# Patient Record
Sex: MALE | Race: BLACK OR AFRICAN AMERICAN | Employment: FULL TIME | URBAN - METROPOLITAN AREA
[De-identification: names, ages, dates, MRNs, and addresses within clinical notes are randomized per-mention and may not be internally consistent; named-entity substitution may affect disease eponyms.]

---

## 2018-07-02 ENCOUNTER — APPOINTMENT (OUTPATIENT)
Dept: GENERAL RADIOLOGY | Facility: CLINIC | Age: 33
End: 2018-07-02
Attending: EMERGENCY MEDICINE
Payer: COMMERCIAL

## 2018-07-02 ENCOUNTER — HOSPITAL ENCOUNTER (EMERGENCY)
Facility: CLINIC | Age: 33
Discharge: HOME | End: 2018-07-02
Attending: EMERGENCY MEDICINE | Admitting: EMERGENCY MEDICINE
Payer: COMMERCIAL

## 2018-07-02 VITALS
DIASTOLIC BLOOD PRESSURE: 90 MMHG | SYSTOLIC BLOOD PRESSURE: 156 MMHG | TEMPERATURE: 98.2 F | RESPIRATION RATE: 18 BRPM | OXYGEN SATURATION: 99 %

## 2018-07-02 DIAGNOSIS — J02.9 ACUTE VIRAL PHARYNGITIS: ICD-10-CM

## 2018-07-02 DIAGNOSIS — R04.2 HEMOPTYSIS: ICD-10-CM

## 2018-07-02 LAB
DEPRECATED S PYO AG THROAT QL EIA: NORMAL
SPECIMEN SOURCE: NORMAL

## 2018-07-02 PROCEDURE — 71046 X-RAY EXAM CHEST 2 VIEWS: CPT

## 2018-07-02 PROCEDURE — 99284 EMERGENCY DEPT VISIT MOD MDM: CPT | Mod: 25

## 2018-07-02 PROCEDURE — 87081 CULTURE SCREEN ONLY: CPT | Performed by: EMERGENCY MEDICINE

## 2018-07-02 PROCEDURE — 87880 STREP A ASSAY W/OPTIC: CPT | Performed by: EMERGENCY MEDICINE

## 2018-07-02 ASSESSMENT — ENCOUNTER SYMPTOMS
VOMITING: 1
SHORTNESS OF BREATH: 0
TROUBLE SWALLOWING: 0
COUGH: 0
ARTHRALGIAS: 0
VOICE CHANGE: 0
BACK PAIN: 0
FEVER: 0
NAUSEA: 0
SORE THROAT: 1

## 2018-07-02 NOTE — ED AVS SNAPSHOT
[facility] Emergency Department    [address]    [facility] [address]    Phone:  [phone]    Fax:  [phone]                                       Miko Dunn   MRN: [id number]    Department:  [facility] Emergency Department   Date of Visit:  7/2/2018           After Visit Summary Signature Page     I have received my discharge instructions, and my questions have been answered. I have discussed any challenges I see with this plan with the nurse or doctor.    ..........................................................................................................................................  Patient/Patient Representative Signature      ..........................................................................................................................................  Patient Representative Print Name and Relationship to Patient    ..................................................               ................................................  Date                                            Time    ..........................................................................................................................................  Reviewed by Signature/Title    ...................................................              ..............................................  Date                                                            Time

## 2018-07-02 NOTE — ED AVS SNAPSHOT
[facility] Emergency Department    [address]    [facility] [address]    Phone:  [phone]    Fax:  [phone]                                       Miko Dunn   MRN: [id number]    Department:  [facility] Emergency Department   Date of Visit:  7/2/2018           Patient Information     Date Of Birth          1985        Your diagnoses for this visit were:     Acute viral pharyngitis     Hemoptysis        You were seen by Rom Laguerre MD.      Follow-up Information     Follow up with PCP as needed.        Discharge Instructions         Hemoptysis    Hemoptysis is the medical term for coughing up blood. There are many causes for this, including minor illnesses like bronchitis. Hemoptysis can also be an early sign of a more serious illness, like a blood clot in the lung (pulmonary embolism), cancer, tuberculosis, or pneumonia.  Less common causes of hemoptysis can be hard to diagnose in an emergency department or a clinic. More testing will be needed if the symptoms continue.  Home care    Stay away from cigarette smoke. Smoke irritates the bronchial passages.    Unless you are taking daily aspirin to prevent stroke or heart attack, do not take aspirin or products that contain aspirin. Aspirin affects how readily the blood clots. Medicines that prevent clotting may make hemoptysis worse.    If you have a lung infection, drinking extra fluid will help loosen secretions in the lungs.    Over-the-counter cough medicines that contain dextromethorphan may help reduce coughing. Check with a medical provider before taking dextromethorphan if you have a chronic illness, are pregnant, or take daily medications.    If you were prescribed an antibiotic, take it until it is all gone. Take it even if you are feeling better after only a few days.  Follow-up care  Follow up with your healthcare provider, or as advised.  When to seek medical advice  Call your healthcare  provider right away if any of these occur:    Fever of 100.4 F (38 C) or higher, or as directed by your healthcare provider  Call 911  Call 911 if any of these occur:    Coughing up an increased amount of blood    Trouble breathing, wheezing, or pain with breathing    Chest pain or chest pressure    Fainting or losing consciousness    Rapid heartbeat    Weakness or dizziness  Date Last Reviewed: 9/13/2015 2000-2017 [employer]. [address] [place] [address]. All rights reserved. This information is not intended as a substitute for professional medical care. Always follow your healthcare professional's instructions.          Hemoptysis    Hemoptysis is the medical term for coughing up blood. There are many causes for this, including minor illnesses like bronchitis. Hemoptysis can also be an early sign of a more serious illness, like a blood clot in the lung (pulmonary embolism), cancer, tuberculosis, or pneumonia.  Less common causes of hemoptysis can be hard to diagnose in an emergency department or a clinic. More testing will be needed if the symptoms continue.  Home care    Stay away from cigarette smoke. Smoke irritates the bronchial passages.    Unless you are taking daily aspirin to prevent stroke or heart attack, do not take aspirin or products that contain aspirin. Aspirin affects how readily the blood clots. Medicines that prevent clotting may make hemoptysis worse.    If you have a lung infection, drinking extra fluid will help loosen secretions in the lungs.    Over-the-counter cough medicines that contain dextromethorphan may help reduce coughing. Check with a medical provider before taking dextromethorphan if you have a chronic illness, are pregnant, or take daily medications.    If you were prescribed an antibiotic, take it until it is all gone. Take it even if you are feeling better after only a few days.  Follow-up care  Follow up with your healthcare provider, or as  advised.  When to seek medical advice  Call your healthcare provider right away if any of these occur:    Fever of 100.4 F (38 C) or higher, or as directed by your healthcare provider  Call 911  Call 911 if any of these occur:    Coughing up an increased amount of blood    Trouble breathing, wheezing, or pain with breathing    Chest pain or chest pressure    Fainting or losing consciousness    Rapid heartbeat    Weakness or dizziness  Date Last Reviewed: 9/13/2015 2000-2017 [employer]. [address] [place], [place] [id number]. All rights reserved. This information is not intended as a substitute for professional medical care. Always follow your healthcare professional's instructions.          24 Hour Appointment Hotline       To make an appointment at any [facility], call [phone] ([phone]). If you don't have a family doctor or clinic, we will help you find one. [facility] clinics are conveniently located to serve the needs of you and your family.             Review of your medicines      Notice     You have not been prescribed any medications.            Procedures and tests performed during your visit     Beta strep group A culture    Chest XR,  PA & LAT    Rapid strep screen      Orders Needing Specimen Collection     None      Pending Results     Date and Time Order Name Status Description    7/2/2018 0445 Beta strep group A culture In process             Pending Culture Results     Date and Time Order Name Status Description    7/2/2018 0445 Beta strep group A culture In process             Pending Results Instructions     If you had any lab results that were not finalized at the time of your Discharge, you can call the ED Lab Result RN at [phone]. You will be contacted by this team for any positive Lab results or changes in treatment. The nurses are available 7 days a week from 10A to 6:30P.  You can leave a message 24 hours per day and they will return your call.         Test Results From Your Hospital Stay        7/2/2018  5:04 AM      Component Results     Component    Specimen Description    Throat    Rapid Strep A Screen    NEGATIVE: No Group A streptococcal antigen detected by immunoassay, await culture report.         7/2/2018  5:05 AM         7/2/2018  7:01 AM      Narrative     CHEST 2 VIEWS   7/2/2018 6:06 AM     HISTORY: Cough.    COMPARISON: None.    FINDINGS: The lungs are clear. Normal-sized cardiac silhouette.        Impression     IMPRESSION: No evidence of active cardiopulmonary disease.    KASSIDY HAYES MD                Clinical Quality Measure: Blood Pressure Screening     Your blood pressure was checked while you were in the emergency department today. The last reading we obtained was  BP: 156/90 . Please read the guidelines below about what these numbers mean and what you should do about them.  If your systolic blood pressure (the top number) is less than 120 and your diastolic blood pressure (the bottom number) is less than 80, then your blood pressure is normal. There is nothing more that you need to do about it.  If your systolic blood pressure (the top number) is 120-139 or your diastolic blood pressure (the bottom number) is 80-89, your blood pressure may be higher than it should be. You should have your blood pressure rechecked within a year by a primary care provider.  If your systolic blood pressure (the top number) is 140 or greater or your diastolic blood pressure (the bottom number) is 90 or greater, you may have high blood pressure. High blood pressure is treatable, but if left untreated over time it can put you at risk for heart attack, stroke, or kidney failure. You should have your blood pressure rechecked by a primary care provider within the next 4 weeks.  If your provider in the emergency department today gave you specific instructions to follow-up with your doctor or provider even sooner than that, you should follow that instruction and  "not wait for up to 4 weeks for your follow-up visit.        Thank you for choosing [facility]       Thank you for choosing [facility] for your care. Our goal is always to provide you with excellent care. Hearing back from our patients is one way we can continue to improve our services. Please take a few minutes to complete the written survey that you may receive in the mail after you visit with us. Thank you!        [employer] Information     [employer] lets you send messages to your doctor, view your test results, renew your prescriptions, schedule appointments and more. To sign up, go to [email or web address] . Click on \"Log in\" on the left side of the screen, which will take you to the Welcome page. Then click on \"Sign up Now\" on the right side of the page.     You will be asked to enter the access code listed below, as well as some personal information. Please follow the directions to create your username and password.     Your access code is: [email or web address]  Expires: 2018  6:37 AM     Your access code will  in 90 days. If you need help or a new code, please call your [facility] clinic or [phone].        Care EveryWhere ID     This is your Care EveryWhere ID. This could be used by other organizations to access your [facility] medical records  [id number]        Equal Access to Services     SRAVAN HUANG AH: Hannah Cerda, waaxda luqadaha, qaybta kaalmada lisa, fernando jacobs. So [facility] [phone].    ATENCIÓN: Si habla español, tiene a thompson disposición servicios gratuitos de asistencia lingüística. Llame al [phone].    We comply with applicable federal civil rights laws and [place] laws. We do not discriminate on the basis of race, color, national origin, age, disability, sex, sexual orientation, or gender identity.            After Visit Summary       This is your record. Keep this with you and show to your community pharmacist(s) and doctor(s) at your next " visit.

## 2018-07-02 NOTE — LETTER
July 2, 2018      To Whom It May Concern:      Miko Dunn was seen in our Emergency Department today, 07/02/18.  I expect his condition to improve over the next 1 day.  He may return to work/school when improved.    Sincerely,        Donna Marlow RN

## 2018-07-02 NOTE — ED TRIAGE NOTES
Alert and oriented x 3 airway,breathing and circulation intact, sore throat today, also c/o off and on rash on throat

## 2018-07-02 NOTE — DISCHARGE INSTRUCTIONS
Hemoptysis    Hemoptysis is the medical term for coughing up blood. There are many causes for this, including minor illnesses like bronchitis. Hemoptysis can also be an early sign of a more serious illness, like a blood clot in the lung (pulmonary embolism), cancer, tuberculosis, or pneumonia.  Less common causes of hemoptysis can be hard to diagnose in an emergency department or a clinic. More testing will be needed if the symptoms continue.  Home care    Stay away from cigarette smoke. Smoke irritates the bronchial passages.    Unless you are taking daily aspirin to prevent stroke or heart attack, do not take aspirin or products that contain aspirin. Aspirin affects how readily the blood clots. Medicines that prevent clotting may make hemoptysis worse.    If you have a lung infection, drinking extra fluid will help loosen secretions in the lungs.    Over-the-counter cough medicines that contain dextromethorphan may help reduce coughing. Check with a medical provider before taking dextromethorphan if you have a chronic illness, are pregnant, or take daily medications.    If you were prescribed an antibiotic, take it until it is all gone. Take it even if you are feeling better after only a few days.  Follow-up care  Follow up with your healthcare provider, or as advised.  When to seek medical advice  Call your healthcare provider right away if any of these occur:    Fever of 100.4 F (38 C) or higher, or as directed by your healthcare provider  Call 911  Call 911 if any of these occur:    Coughing up an increased amount of blood    Trouble breathing, wheezing, or pain with breathing    Chest pain or chest pressure    Fainting or losing consciousness    Rapid heartbeat    Weakness or dizziness  Date Last Reviewed: 9/13/2015 2000-2017 [employer]. [address] [place] [address], [place] [address]. All rights reserved. This information is not intended as a substitute for professional medical care. Always  follow your healthcare professional's instructions.          Hemoptysis    Hemoptysis is the medical term for coughing up blood. There are many causes for this, including minor illnesses like bronchitis. Hemoptysis can also be an early sign of a more serious illness, like a blood clot in the lung (pulmonary embolism), cancer, tuberculosis, or pneumonia.  Less common causes of hemoptysis can be hard to diagnose in an emergency department or a clinic. More testing will be needed if the symptoms continue.  Home care    Stay away from cigarette smoke. Smoke irritates the bronchial passages.    Unless you are taking daily aspirin to prevent stroke or heart attack, do not take aspirin or products that contain aspirin. Aspirin affects how readily the blood clots. Medicines that prevent clotting may make hemoptysis worse.    If you have a lung infection, drinking extra fluid will help loosen secretions in the lungs.    Over-the-counter cough medicines that contain dextromethorphan may help reduce coughing. Check with a medical provider before taking dextromethorphan if you have a chronic illness, are pregnant, or take daily medications.    If you were prescribed an antibiotic, take it until it is all gone. Take it even if you are feeling better after only a few days.  Follow-up care  Follow up with your healthcare provider, or as advised.  When to seek medical advice  Call your healthcare provider right away if any of these occur:    Fever of 100.4 F (38 C) or higher, or as directed by your healthcare provider  Call 911  Call 911 if any of these occur:    Coughing up an increased amount of blood    Trouble breathing, wheezing, or pain with breathing    Chest pain or chest pressure    Fainting or losing consciousness    Rapid heartbeat    Weakness or dizziness  Date Last Reviewed: 9/13/2015 2000-2017 [employer]. [address] [place] [id number]. All rights reserved. This information is not intended  as a substitute for professional medical care. Always follow your healthcare professional's instructions.

## 2018-07-02 NOTE — ED PROVIDER NOTES
History     Chief Complaint:  Pharyngitis    HPI   Miko Dunn is a 33 year old male who presents with pharyngitis. The patient reports that he first developed a sore throat approximately 3 days ago and since has developed a cough with vomiting secondary to this. Within the past 24 hours the patient developed congestion and rhinorrhea. He presents today as he had an episode of coughing with a small amount of red streaking in it and became concerned. He denies any fevers, chest pain, shortness of breath, or any other symptoms.    Allergies:  No known drug allergies.    Medications:    The patient is not currently taking any prescribed medications.     Past Medical History:    No significant past medical history.     Past Surgical History:    No pertinent past surgical history.    Family History:    No pertinent family history.    Social History:  Smoking status: Current smoker  Alcohol use: Yes  Marital Status:  Single      Review of Systems   Constitutional: Negative for fever.   HENT: Positive for sore throat. Negative for trouble swallowing and voice change.    Respiratory: Negative for cough and shortness of breath.    Cardiovascular: Negative for chest pain.   Gastrointestinal: Positive for vomiting. Negative for nausea.   Musculoskeletal: Negative for arthralgias and back pain.   All other systems reviewed and are negative.    Physical Exam     Patient Vitals for the past 24 hrs:   BP Temp Temp src Heart Rate Resp SpO2   07/02/18 0430 156/90 98.2  F (36.8  C) Temporal 76 18 99 %         Physical Exam  Nursing note and vitals reviewed.  Constitutional: Cooperative. Sitting up comfortably   HENT:  Oropharynx normal other than slight erythema.  No abscess or exudate.   Mouth/Throat: Mucous membranes are normal.    Cardiovascular: Normal rate, regular rhythm and normal heart sounds.  No murmur.  Pulmonary/Chest: Effort normal and breath sounds normal. No respiratory distress. No wheezes. No rales.    Abdominal: Soft. Normal appearance and bowel sounds are normal. No distension. There is no tenderness. There is no rigidity and no guarding.   Neurological: Alert.   Skin: Skin is warm and dry. Slight erythematous rash to anterior neck  Psychiatric: Normal mood and affect.      Emergency Department Course   Imaging:  XR Chest 2 views  IMPRESSION: No evidence of active cardiopulmonary disease.  Report per radiology.     Laboratory:  Rapid strep test is negative.  Beta Strep Culture A: Pending    Emergency Department Course:  Nursing notes and vitals reviewed.  (0511) I performed an exam of the patient as documented above.    The patient was sent for a x-ray while in the emergency department, findings above.   The patient's throat was swabbed and this sample was sent for rapid strep screen, findings above.     Findings and plan explained to the patient. Patient discharged home with instructions regarding supportive care, medications, and reasons to return. The importance of close follow-up was reviewed.  Impression & Plan    Medical Decision Making:  Miko Dunn is a 33 year old male who presents with sore throat and cough. He had some mild hemoptysis but no concern for large volume hemoptysis. Findings not concerning for pulmonary embolism with clear infectious symptoms. Chest x-ray is clear. Strep test was negative. He also describes a rash on his neck. He does have mild erythema which has been present for 3-4 months. No concern for emergent or life-threatening etiology to this rash and recommended outpatient follow up.     Diagnosis:    ICD-10-CM   1. Acute viral pharyngitis J02.8   2. Hemoptysis R04.2       Disposition:  Patient is discharged to home.            I, Ray Andrea, am serving as a scribe on 7/2/2018 at 5:11 AM to personally document services performed by Dr. Laguerre based on my observations and the provider's statements to me.      Ray Andrea  7/2/2018   [facility]  EMERGENCY DEPARTMENT       Rom Laguerre MD  07/02/18 3608

## 2018-07-04 LAB
BACTERIA SPEC CULT: NORMAL
SPECIMEN SOURCE: NORMAL

## 2019-08-22 ENCOUNTER — OFFICE VISIT (OUTPATIENT)
Dept: FAMILY MEDICINE | Facility: CLINIC | Age: 34
End: 2019-08-22
Payer: COMMERCIAL

## 2019-08-22 ENCOUNTER — ANCILLARY PROCEDURE (OUTPATIENT)
Dept: GENERAL RADIOLOGY | Facility: CLINIC | Age: 34
End: 2019-08-22
Attending: FAMILY MEDICINE
Payer: COMMERCIAL

## 2019-08-22 VITALS
SYSTOLIC BLOOD PRESSURE: 128 MMHG | TEMPERATURE: 98.1 F | RESPIRATION RATE: 12 BRPM | WEIGHT: 244 LBS | HEIGHT: 74 IN | HEART RATE: 66 BPM | DIASTOLIC BLOOD PRESSURE: 74 MMHG | OXYGEN SATURATION: 98 % | BODY MASS INDEX: 31.32 KG/M2

## 2019-08-22 DIAGNOSIS — Z00.00 ROUTINE GENERAL MEDICAL EXAMINATION AT A HEALTH CARE FACILITY: Primary | ICD-10-CM

## 2019-08-22 DIAGNOSIS — R19.7 DIARRHEA OF PRESUMED INFECTIOUS ORIGIN: ICD-10-CM

## 2019-08-22 DIAGNOSIS — E66.09 CLASS 1 OBESITY DUE TO EXCESS CALORIES WITHOUT SERIOUS COMORBIDITY IN ADULT, UNSPECIFIED BMI: ICD-10-CM

## 2019-08-22 DIAGNOSIS — R40.0 HAS DAYTIME DROWSINESS: ICD-10-CM

## 2019-08-22 DIAGNOSIS — E66.811 CLASS 1 OBESITY DUE TO EXCESS CALORIES WITHOUT SERIOUS COMORBIDITY IN ADULT, UNSPECIFIED BMI: ICD-10-CM

## 2019-08-22 DIAGNOSIS — Z72.0 TOBACCO ABUSE: ICD-10-CM

## 2019-08-22 DIAGNOSIS — D22.9 ATYPICAL NEVUS: ICD-10-CM

## 2019-08-22 DIAGNOSIS — Z23 NEED FOR VACCINATION: ICD-10-CM

## 2019-08-22 LAB
FEF 25/75: NORMAL
FEV-1: NORMAL
FEV1/FVC: NORMAL
FVC: NORMAL

## 2019-08-22 PROCEDURE — 36415 COLL VENOUS BLD VENIPUNCTURE: CPT | Performed by: FAMILY MEDICINE

## 2019-08-22 PROCEDURE — 90732 PPSV23 VACC 2 YRS+ SUBQ/IM: CPT | Performed by: FAMILY MEDICINE

## 2019-08-22 PROCEDURE — 90471 IMMUNIZATION ADMIN: CPT | Performed by: FAMILY MEDICINE

## 2019-08-22 PROCEDURE — 99385 PREV VISIT NEW AGE 18-39: CPT | Mod: 25 | Performed by: FAMILY MEDICINE

## 2019-08-22 PROCEDURE — 80061 LIPID PANEL: CPT | Performed by: FAMILY MEDICINE

## 2019-08-22 PROCEDURE — 87389 HIV-1 AG W/HIV-1&-2 AB AG IA: CPT | Performed by: FAMILY MEDICINE

## 2019-08-22 PROCEDURE — 71046 X-RAY EXAM CHEST 2 VIEWS: CPT

## 2019-08-22 PROCEDURE — 84443 ASSAY THYROID STIM HORMONE: CPT | Performed by: FAMILY MEDICINE

## 2019-08-22 PROCEDURE — 90472 IMMUNIZATION ADMIN EACH ADD: CPT | Performed by: FAMILY MEDICINE

## 2019-08-22 PROCEDURE — 80053 COMPREHEN METABOLIC PANEL: CPT | Performed by: FAMILY MEDICINE

## 2019-08-22 PROCEDURE — 94010 BREATHING CAPACITY TEST: CPT | Performed by: FAMILY MEDICINE

## 2019-08-22 PROCEDURE — 90715 TDAP VACCINE 7 YRS/> IM: CPT | Performed by: FAMILY MEDICINE

## 2019-08-22 RX ORDER — VARENICLINE TARTRATE 1 MG/1
1 TABLET, FILM COATED ORAL 2 TIMES DAILY
Qty: 60 TABLET | Refills: 11 | Status: SHIPPED | OUTPATIENT
Start: 2019-08-22

## 2019-08-22 ASSESSMENT — ENCOUNTER SYMPTOMS
DIZZINESS: 0
FREQUENCY: 0
WEAKNESS: 0
COUGH: 1
EYE PAIN: 0
NERVOUS/ANXIOUS: 0
SHORTNESS OF BREATH: 0
NAUSEA: 0
DYSURIA: 0
PARESTHESIAS: 0
PALPITATIONS: 0
HEADACHES: 1
HEARTBURN: 0
ARTHRALGIAS: 0
CONSTIPATION: 0
DIARRHEA: 1
FEVER: 0
HEMATURIA: 0
JOINT SWELLING: 0
SORE THROAT: 0
MYALGIAS: 1
CHILLS: 0
HEMATOCHEZIA: 0
ABDOMINAL PAIN: 0

## 2019-08-22 ASSESSMENT — MIFFLIN-ST. JEOR: SCORE: 2106.53

## 2019-08-22 NOTE — LETTER
August 27, 2019      Miko Dunn  [address] [place] [address]        Dear LuzDunn,    We are writing to inform you of your test results.    Tests are quite good     Resulted Orders   HIV Screening   Result Value Ref Range    HIV Antigen Antibody Combo Nonreactive NR^Nonreactive          Comment:      HIV-1 p24 Ag & HIV-1/HIV-2 Ab Not Detected   Lipid panel reflex to direct LDL Non-fasting   Result Value Ref Range    Cholesterol 211 (H) <200 mg/dL      Comment:      Desirable:       <200 mg/dl    Triglycerides 126 <150 mg/dL      Comment:      Fasting specimen    HDL Cholesterol 39 (L) >39 mg/dL    LDL Cholesterol Calculated 147 (H) <100 mg/dL      Comment:      Above desirable:  100-129 mg/dl  Borderline High:  130-159 mg/dL  High:             160-189 mg/dL  Very high:       >189 mg/dl      Non HDL Cholesterol 172 (H) <130 mg/dL      Comment:      Above Desirable:  130-159 mg/dl  Borderline high:  160-189 mg/dl  High:             190-219 mg/dl  Very high:       >219 mg/dl     TSH with free T4 reflex   Result Value Ref Range    TSH 0.60 0.40 - 4.00 mU/L   Comprehensive metabolic panel   Result Value Ref Range    Sodium 140 133 - 144 mmol/L    Potassium 4.5 3.4 - 5.3 mmol/L    Chloride 107 94 - 109 mmol/L    Carbon Dioxide 27 20 - 32 mmol/L    Anion Gap 6 3 - 14 mmol/L    Glucose 89 70 - 99 mg/dL      Comment:      Fasting specimen    Urea Nitrogen 15 7 - 30 mg/dL    Creatinine 1.09 0.66 - 1.25 mg/dL    GFR Estimate 87 >60 mL/min/[1.73_m2]      Comment:      Non  GFR Calc  Starting 12/18/2018, serum creatinine based estimated GFR (eGFR) will be   calculated using the Chronic Kidney Disease Epidemiology Collaboration   (CKD-EPI) equation.      GFR Estimate If Black >90 >60 mL/min/[1.73_m2]      Comment:       GFR Calc  Starting 12/18/2018, serum creatinine based estimated GFR (eGFR) will be   calculated using the Chronic Kidney Disease Epidemiology Collaboration   (CKD-EPI)  equation.      Calcium 9.3 8.5 - 10.1 mg/dL    Bilirubin Total 0.7 0.2 - 1.3 mg/dL    Albumin 4.2 3.4 - 5.0 g/dL    Protein Total 7.7 6.8 - 8.8 g/dL    Alkaline Phosphatase 74 40 - 150 U/L    ALT 50 0 - 70 U/L    AST 17 0 - 45 U/L       If you have any questions or concerns, please call the clinic at the number listed above.       Sincerely,        Ramón Drake MD

## 2019-08-22 NOTE — PROGRESS NOTES
[employer]     Answers for HPI/ROS submitted by the patient on 8/22/2019   Annual Exam:  Frequency of exercise:: 2-3 days/week  Getting at least 3 servings of Calcium per day:: Yes  Diet:: Low salt, Carbohydrate counting  Taking medications regularly:: Yes  Medication side effects:: None  Bi-annual eye exam:: NO  Dental care twice a year:: NO  Sleep apnea or symptoms of sleep apnea:: Daytime drowsiness  abdominal pain: No  Blood in stool: No  Blood in urine: No  chest pain: No  chills: No  congestion: Yes  constipation: No  cough: Yes  diarrhea: Yes  dizziness: No  ear pain: No  eye pain: No  nervous/anxious: No  fever: No  frequency: No  genital sores: No  headaches: Yes  hearing loss: No  heartburn: No  arthralgias: No  joint swelling: No  peripheral edema: No  mood changes: No  myalgias: Yes  nausea: No  dysuria: No  palpitations: No  Skin sensation changes: No  sore throat: No  urgency: No  rash: No  shortness of breath: No  visual disturbance: No  weakness: No  impotence: No  penile discharge: No  Additional concerns today:: Yes  Duration of exercise:: 45-60 minutes

## 2019-08-22 NOTE — PROGRESS NOTES
Screening Questionnaire for Adult Immunization    Are you sick today?   No   Do you have allergies to medications, food, a vaccine component or latex?   No   Have you ever had a serious reaction after receiving a vaccination?   No   Do you have a long-term health problem with heart disease, lung disease, asthma, kidney disease, metabolic disease (e.g. diabetes), anemia, or other blood disorder?   No   Do you have cancer, leukemia, HIV/AIDS, or any other immune system problem?   No   In the past 3 months, have you taken medications that affect  your immune system, such as prednisone, other steroids, or anticancer drugs; drugs for the treatment of rheumatoid arthritis, Crohn s disease, or psoriasis; or have you had radiation treatments?   No   Have you had a seizure, or a brain or other nervous system problem?   No   During the past year, have you received a transfusion of blood or blood     products, or been given immune (gamma) globulin or antiviral drug?   No   For women: Are you pregnant or is there a chance you could become        pregnant during the next month?   No   Have you received any vaccinations in the past 4 weeks?   No     Immunization questionnaire answers were all negative.        Per orders of Dr. Drake, injection of see orders given by Urszula Smith MA. Patient instructed to remain in clinic for 15 minutes afterwards, and to report any adverse reaction to me immediately.       Screening performed by Urszula Smith MA on 8/22/2019 at 11:27 AM.    Answers for HPI/ROS submitted by the patient on 8/22/2019   Annual Exam:  Frequency of exercise:: 2-3 days/week  Getting at least 3 servings of Calcium per day:: Yes  Diet:: Low salt, Carbohydrate counting  Taking medications regularly:: Yes  Medication side effects:: None  Bi-annual eye exam:: NO  Dental care twice a year:: NO  Sleep apnea or symptoms of sleep apnea:: Daytime drowsiness  abdominal pain: No  Blood in stool: No  Blood in urine: No  chest pain:  No  chills: No  congestion: Yes  constipation: No  cough: Yes  diarrhea: Yes  dizziness: No  ear pain: No  eye pain: No  nervous/anxious: No  fever: No  frequency: No  genital sores: No  headaches: Yes  hearing loss: No  heartburn: No  arthralgias: No  joint swelling: No  peripheral edema: No  mood changes: No  myalgias: Yes  nausea: No  dysuria: No  palpitations: No  Skin sensation changes: No  sore throat: No  urgency: No  rash: No  shortness of breath: No  visual disturbance: No  weakness: No  impotence: No  penile discharge: No  Additional concerns today:: Yes  Duration of exercise:: 45-60 minutes

## 2019-08-22 NOTE — PROGRESS NOTES
SUBJECTIVE:   CC: Miko Dunn is an 34 year old male who presents for preventative health visit.     Healthy Habits:     Getting at least 3 servings of Calcium per day:  Yes    Bi-annual eye exam:  NO    Dental care twice a year:  NO    Sleep apnea or symptoms of sleep apnea:  Daytime drowsiness    Diet:  Low salt and Carbohydrate counting    Frequency of exercise:  2-3 days/week    Duration of exercise:  45-60 minutes    Taking medications regularly:  Yes    Medication side effects:  None    PHQ-2 Total Score: 0    Additional concerns today:  Yes        Today's PHQ-2 Score:   PHQ-2 ( 1999 [employer]) 8/22/2019   Q1: Little interest or pleasure in doing things 0   Q2: Feeling down, depressed or hopeless 0   PHQ-2 Score 0   Q1: Little interest or pleasure in doing things Not at all   Q2: Feeling down, depressed or hopeless Not at all   PHQ-2 Score 0       Abuse: Current or Past(Physical, Sexual or Emotional)- No  Do you feel safe in your environment? Yes    Social History     Tobacco Use     Smoking status: Current Every Day Smoker     Packs/day: 0.50     Smokeless tobacco: Never Used     Tobacco comment: 1/2 - 1 pack per day   Substance Use Topics     Alcohol use: Yes     Comment: weekends         Alcohol Use 8/22/2019   Prescreen: >3 drinks/day or >7 drinks/week? No       Last PSA: No results found for: PSA    Reviewed orders with patient. Reviewed health maintenance and updated orders accordingly - Yes      Reviewed and updated as needed this visit by clinical staff  Tobacco  Allergies  Meds  Med Hx  Surg Hx  Fam Hx  Soc Hx        Reviewed and updated as needed this visit by Provider        Past Medical History:   Diagnosis Date     Atypical nevus 8/22/2019     Diarrhea of presumed infectious origin 8/23/2019     Has daytime drowsiness 8/23/2019     NO ACTIVE PROBLEMS      Obesity due to excess calories without serious comorbidity 8/23/2019     Tobacco abuse 8/22/2019       Past Surgical History:   Procedure  To nursing,   ok to take atenolol as 25 mg bid. Ok to go back to simvastatin 10 mg daily #90 with 3 refills. Lipid and AST in 6 wks. Lab orders entered. Please mail her a copy of lipid AST orders so lab doesn't get mixed up with July lab orders.    [facility] "Laterality Date     DENTAL SURGERY         Family History   Problem Relation Age of Onset     Hypothyroidism Mother        Review of Systems   Constitutional: Negative for chills and fever.   HENT: Positive for congestion. Negative for ear pain, hearing loss and sore throat.    Eyes: Negative for pain and visual disturbance.   Respiratory: Positive for cough. Negative for shortness of breath.    Cardiovascular: Negative for chest pain, palpitations and peripheral edema.   Gastrointestinal: Positive for diarrhea. Negative for abdominal pain, constipation, heartburn, hematochezia and nausea.   Genitourinary: Negative for discharge, dysuria, frequency, genital sores, hematuria, impotence and urgency.   Musculoskeletal: Positive for myalgias. Negative for arthralgias and joint swelling.   Skin: Negative for rash.        See additional note   Neurological: Positive for headaches. Negative for dizziness, weakness and paresthesias.   Hematological:        See additional note   Psychiatric/Behavioral: Negative for mood changes. The patient is not nervous/anxious.        Diarrhea- Stomach discomfort and Diarrhea last week.  Resolved with a few doses of probiotic, stoppe, returned 1-2 d               This document serves as a record of the services and decisions personally performed and made by Ramón Drake MD. It was created on his behalf by Zak Varela, a trained medical scribe. The creation of this document is based on the provider's statements to the medical scribe.  Zak Varela August 22, 2019 11:24 AM    OBJECTIVE:   /74 (BP Location: Right arm, Patient Position: Sitting, Cuff Size: Adult Large)   Pulse 66   Temp 98.1  F (36.7  C) (Oral)   Resp 12   Ht 1.88 m (6' 2\")   Wt 110.7 kg (244 lb)   SpO2 98%   BMI 31.33 kg/m      Physical Exam  EYES: Eyes grossly normal to inspection, PERRL and conjunctivae and sclerae normal  HENT: ear canals and TM's normal, nose and mouth without ulcers or lesions  NECK: No " thyromegaly, trachea midline, no nodes  RESP: lungs clear to auscultation - no rales, rhonchi or wheezes  HEART: S1S2 RRR no murmur nor apical displacement  ABDOMEN: soft, nontender, no hepatosplenomegaly, no masses and bowel sounds normal  MS:  no edema. Clubbing fingers   SKIN: see additional note  NEURO: Normal strength and tone, mentation intact and speech normal  PSYCH: mentation appears normal, affect normal/bright        Diagnostic Test Results:  Labs reviewed in Epic  Results for orders placed or performed in visit on 08/22/19 (from the past 24 hour(s))   XR Chest 2 Views    Narrative    CHEST TWO VIEWS  8/22/2019 12:02 PM     HISTORY:  Routine general medical examination at a health care  facility.    COMPARISON: 7/2/2018.      Impression    IMPRESSION: No acute cardiopulmonary disease.    JEREMY ROSS MD   Spirometry normal    ASSESSMENT/PLAN:     Assessment & Plan   Problem List Items Addressed This Visit        Digestive    Obesity due to excess calories without serious comorbidity (Chronic)     Recommend fiber         Diarrhea of presumed infectious origin     Brief, probiotic responsive.Likely viral. Observe            Musculoskeletal and Integumentary    Atypical nevus     Refer.         Relevant Orders    DERMATOLOGY REFERRAL       Behavioral    Tobacco abuse     Contemplative Start Chantix.. Clubbing, nl spirometry         Relevant Medications    varenicline (CHANTIX TRINH) 0.5 MG X 11 & 1 MG X 42 tablet    varenicline (CHANTIX) 1 MG tablet    Other Relevant Orders    Spirometry, Breathing Capacity: Normal Order, Clinic Performed (Completed)    Comprehensive metabolic panel (Completed)       Other    Routine general medical examination at a health care facility - Primary     Completed.         Relevant Orders    HIV Screening (Completed)    Lipid panel reflex to direct LDL Non-fasting (Completed)    TSH with free T4 reflex (Completed)    XR Chest 2 Views (Completed)    Spirometry, Breathing Capacity:  "Normal Order, Clinic Performed (Completed)    Comprehensive metabolic panel (Completed)    Need for vaccination     Due.         Relevant Orders    TDAP, IM (10 - 64 YRS) - Adacel (Completed)    PPSV23, IM/SUBQ (2+ YRS) - [id number] (Completed)    Has daytime drowsiness     No naps, sleeps well                COUNSELING:   Reviewed preventive health counseling, as reflected in patient instructions    Estimated body mass index is 31.33 kg/m  as calculated from the following:    Height as of this encounter: 1.88 m (6' 2\").    Weight as of this encounter: 110.7 kg (244 lb).  Weight management plan: Discussed healthy diet and exercise guidelines He eats NO vegetables, mostly meat     reports that he has been smoking.  He has been smoking about 0.50 packs per day. He has never used smokeless tobacco.  Tobacco Cessation Action Plan: Pharmacotherapies : Chantix       Counseling Resources:  ATP IV Guidelines  Pooled Cohorts Equation Calculator  FRAX Risk Assessment  ICSI Preventive Guidelines  Dietary Guidelines for Americans, 2010  USDA's MyPlate  ASA Prophylaxis  Lung CA Screening    The information in this document, created by the medical scribe for me, accurately reflects the services I personally performed and the decisions made by me. I have reviewed and approved this document for accuracy prior to leaving the patient care area.  August 22, 2019 11:25 AM    Ramón Drake MD  [facility]– [place]"

## 2019-08-23 PROBLEM — R19.7 DIARRHEA OF PRESUMED INFECTIOUS ORIGIN: Status: ACTIVE | Noted: 2019-08-23

## 2019-08-23 PROBLEM — E66.09 OBESITY DUE TO EXCESS CALORIES WITHOUT SERIOUS COMORBIDITY: Chronic | Status: ACTIVE | Noted: 2019-08-23

## 2019-08-23 PROBLEM — E66.09 OBESITY DUE TO EXCESS CALORIES WITHOUT SERIOUS COMORBIDITY: Status: ACTIVE | Noted: 2019-08-23

## 2019-08-23 PROBLEM — R40.0 HAS DAYTIME DROWSINESS: Status: ACTIVE | Noted: 2019-08-23

## 2019-08-23 LAB
ALBUMIN SERPL-MCNC: 4.2 G/DL (ref 3.4–5)
ALP SERPL-CCNC: 74 U/L (ref 40–150)
ALT SERPL W P-5'-P-CCNC: 50 U/L (ref 0–70)
ANION GAP SERPL CALCULATED.3IONS-SCNC: 6 MMOL/L (ref 3–14)
AST SERPL W P-5'-P-CCNC: 17 U/L (ref 0–45)
BILIRUB SERPL-MCNC: 0.7 MG/DL (ref 0.2–1.3)
BUN SERPL-MCNC: 15 MG/DL (ref 7–30)
CALCIUM SERPL-MCNC: 9.3 MG/DL (ref 8.5–10.1)
CHLORIDE SERPL-SCNC: 107 MMOL/L (ref 94–109)
CHOLEST SERPL-MCNC: 211 MG/DL
CO2 SERPL-SCNC: 27 MMOL/L (ref 20–32)
CREAT SERPL-MCNC: 1.09 MG/DL (ref 0.66–1.25)
GFR SERPL CREATININE-BSD FRML MDRD: 87 ML/MIN/{1.73_M2}
GLUCOSE SERPL-MCNC: 89 MG/DL (ref 70–99)
HDLC SERPL-MCNC: 39 MG/DL
HIV 1+2 AB+HIV1 P24 AG SERPL QL IA: NONREACTIVE
LDLC SERPL CALC-MCNC: 147 MG/DL
NONHDLC SERPL-MCNC: 172 MG/DL
POTASSIUM SERPL-SCNC: 4.5 MMOL/L (ref 3.4–5.3)
PROT SERPL-MCNC: 7.7 G/DL (ref 6.8–8.8)
SODIUM SERPL-SCNC: 140 MMOL/L (ref 133–144)
TRIGL SERPL-MCNC: 126 MG/DL
TSH SERPL DL<=0.005 MIU/L-ACNC: 0.6 MU/L (ref 0.4–4)

## 2019-08-23 ASSESSMENT — ENCOUNTER SYMPTOMS: ROS SKIN COMMENTS: SEE ADDITIONAL NOTE

## 2019-08-23 NOTE — PROGRESS NOTES
"  Atypical nevus on back noted by tense. Duration unclear.    ROS no itch bleeding    /74 (BP Location: Right arm, Patient Position: Sitting, Cuff Size: Adult Large)   Pulse 66   Temp 98.1  F (36.7  C) (Oral)   Resp 12   Ht 1.88 m (6' 2\")   Wt 110.7 kg (244 lb)   SpO2 98%   BMI 31.33 kg/m    A few nevi. One of note mid thoracic back multicolored, indiscrete edges    ASSESSMENT / PLAN:      (D22.9) Atypical nevus  Comment: discussed  Plan: DERMATOLOGY REFERRAL                    Ramón Drake MD      Answers for HPI/ROS submitted by the patient on 8/22/2019   Annual Exam:  Frequency of exercise:: 2-3 days/week  Getting at least 3 servings of Calcium per day:: Yes  Diet:: Low salt, Carbohydrate counting  Taking medications regularly:: Yes  Medication side effects:: None  Bi-annual eye exam:: NO  Dental care twice a year:: NO  Sleep apnea or symptoms of sleep apnea:: Daytime drowsiness  abdominal pain: No  Blood in stool: No  Blood in urine: No  chest pain: No  chills: No  congestion: Yes  constipation: No  cough: Yes  diarrhea: Yes  dizziness: No  ear pain: No  eye pain: No  nervous/anxious: No  fever: No  frequency: No  genital sores: No  headaches: Yes  hearing loss: No  heartburn: No  arthralgias: No  joint swelling: No  peripheral edema: No  mood changes: No  myalgias: Yes  nausea: No  dysuria: No  palpitations: No  Skin sensation changes: No  sore throat: No  urgency: No  rash: No  shortness of breath: No  visual disturbance: No  weakness: No  impotence: No  penile discharge: No  Additional concerns today:: Yes  Duration of exercise:: 45-60 minutes    "

## 2020-11-10 ENCOUNTER — APPOINTMENT (RX ONLY)
Dept: URBAN - METROPOLITAN AREA CLINIC 51 | Facility: CLINIC | Age: 35
Setting detail: DERMATOLOGY
End: 2020-11-10

## 2020-11-10 ENCOUNTER — APPOINTMENT (RX ONLY)
Dept: URBAN - METROPOLITAN AREA CLINIC 52 | Facility: CLINIC | Age: 35
Setting detail: DERMATOLOGY
End: 2020-11-10

## 2020-11-10 DIAGNOSIS — D485 NEOPLASM OF UNCERTAIN BEHAVIOR OF SKIN: ICD-10-CM

## 2020-11-10 DIAGNOSIS — L29.89 OTHER PRURITUS: ICD-10-CM

## 2020-11-10 DIAGNOSIS — L20.89 OTHER ATOPIC DERMATITIS: ICD-10-CM

## 2020-11-10 DIAGNOSIS — L29.8 OTHER PRURITUS: ICD-10-CM

## 2020-11-10 PROBLEM — D48.5 NEOPLASM OF UNCERTAIN BEHAVIOR OF SKIN: Status: ACTIVE | Noted: 2020-11-10

## 2020-11-10 PROBLEM — L20.84 INTRINSIC (ALLERGIC) ECZEMA: Status: ACTIVE | Noted: 2020-11-10

## 2020-11-10 PROCEDURE — ? INJECTION

## 2020-11-10 PROCEDURE — ? PRESCRIPTION

## 2020-11-10 PROCEDURE — ? DEFER

## 2020-11-10 PROCEDURE — ? OBSERVATION AND MEASURE

## 2020-11-10 PROCEDURE — ? COUNSELING

## 2020-11-10 PROCEDURE — 96372 THER/PROPH/DIAG INJ SC/IM: CPT

## 2020-11-10 PROCEDURE — 99202 OFFICE O/P NEW SF 15 MIN: CPT | Mod: 25

## 2020-11-10 RX ORDER — BETAMETHASONE VALERATE 1 MG/G
0.1% OINTMENT TOPICAL
Qty: 45 | Refills: 0 | Status: ERX | COMMUNITY
Start: 2020-11-10

## 2020-11-10 RX ADMIN — BETAMETHASONE VALERATE 0.1%: 1 OINTMENT TOPICAL at 00:00

## 2020-11-10 ASSESSMENT — LOCATION ZONE DERM
LOCATION ZONE: TRUNK
LOCATION ZONE: NECK
LOCATION ZONE: ARM
LOCATION ZONE: NECK
LOCATION ZONE: TRUNK
LOCATION ZONE: ARM

## 2020-11-10 ASSESSMENT — LOCATION SIMPLE DESCRIPTION DERM
LOCATION SIMPLE: LEFT ANTERIOR NECK
LOCATION SIMPLE: LEFT ANTERIOR NECK
LOCATION SIMPLE: RIGHT ANTERIOR NECK
LOCATION SIMPLE: RIGHT SHOULDER
LOCATION SIMPLE: RIGHT UPPER BACK
LOCATION SIMPLE: ABDOMEN
LOCATION SIMPLE: ABDOMEN
LOCATION SIMPLE: RIGHT SHOULDER
LOCATION SIMPLE: RIGHT ANTERIOR NECK
LOCATION SIMPLE: RIGHT UPPER BACK

## 2020-11-10 ASSESSMENT — LOCATION DETAILED DESCRIPTION DERM
LOCATION DETAILED: RIGHT LATERAL SHOULDER
LOCATION DETAILED: RIGHT LATERAL SHOULDER
LOCATION DETAILED: RIGHT MID-UPPER BACK
LOCATION DETAILED: PERIUMBILICAL SKIN
LOCATION DETAILED: RIGHT CLAVICULAR NECK
LOCATION DETAILED: LEFT LATERAL ABDOMEN
LOCATION DETAILED: RIGHT INFERIOR LATERAL NECK
LOCATION DETAILED: LEFT CLAVICULAR NECK
LOCATION DETAILED: RIGHT MID-UPPER BACK
LOCATION DETAILED: PERIUMBILICAL SKIN
LOCATION DETAILED: RIGHT CLAVICULAR NECK
LOCATION DETAILED: RIGHT INFERIOR LATERAL NECK
LOCATION DETAILED: LEFT CLAVICULAR NECK
LOCATION DETAILED: LEFT LATERAL ABDOMEN

## 2020-11-10 NOTE — PROCEDURE: INJECTION
Dose Administered (Numbers Only - Mg, G, Mcg, Units, Cc): 0
Bill J-Code: yes
Hide Second Medication?: No
Procedure Information: Please note that the numeric value listed in the Medication (1) and associated J-code units and Medication (2) and associated J-code units variables are j-code amounts and do not represent either the concentration or the total amount of the medications injected.  I strongly recommend selecting no to the Render J-code information in note question. This will allow your note to be more clear. If you are billing j-codes with your injection codes you need to document the total amount of the medication injected. This amount should match the j-code units. For example, if you are injecting Triamcinolone 40mg as an intramuscular injection you would select 40 for the dose field and mg for the units. This would allow you to document  with 4 units (40mg = 10mg x 4). The total volume is not used to calculate j-codes only the amount of the medication administered.
Units: cc
Dose Administered (Numbers Only - Mg, G, Mcg, Units, Cc): 1
Administered By (Optional): Devon GALAN
Consent: The risks of the medication were reviewed with the patient.
Detail Level: None
Medication (1) And Associated J-Code Units: Depomedrol, 40mg
Route: IM
Post-Care Instructions: I reviewed with the patient in detail post-care instructions. Patient understands to keep the injection sites clean and call the clinic if there is any redness, swelling or pain.

## 2020-11-10 NOTE — PROCEDURE: DEFER
Introduction Text (Please End With A Colon): The following procedure was deferred:
Procedure To Be Performed At Next Visit: Biopsy by shave method
Detail Level: Detailed
Instructions (Optional): Patient is going to the beach in the coming weeks. We have discussed and decided to wait until after his vacations to perform his shave biopsy procedure.

## 2020-11-10 NOTE — PROCEDURE: DEFER
Instructions (Optional): Patient is going to the beach in the coming weeks. We have discussed and decided to wait until after his vacations to perform his shave biopsy procedure.
Introduction Text (Please End With A Colon): The following procedure was deferred:
Procedure To Be Performed At Next Visit: Biopsy by shave method
Detail Level: Detailed

## 2020-11-10 NOTE — PROCEDURE: INJECTION
Route: IM
Administered By (Optional): Smith ROBERTSON
Procedure Information: Please note that the numeric value listed in the Medication (1) and associated J-code units and Medication (2) and associated J-code units variables are j-code amounts and do not represent either the concentration or the total amount of the medications injected.  I strongly recommend selecting no to the Render J-code information in note question. This will allow your note to be more clear. If you are billing j-codes with your injection codes you need to document the total amount of the medication injected. This amount should match the j-code units. For example, if you are injecting Triamcinolone 40mg as an intramuscular injection you would select 40 for the dose field and mg for the units. This would allow you to document  with 4 units (40mg = 10mg x 4). The total volume is not used to calculate j-codes only the amount of the medication administered.
Consent: The risks of the medication were reviewed with the patient.
Dose Administered (Numbers Only - Mg, G, Mcg, Units, Cc): 0
Detail Level: None
Units: cc
Bill J-Code: yes
Medication (1) And Associated J-Code Units: Depomedrol, 40mg
Post-Care Instructions: I reviewed with the patient in detail post-care instructions. Patient understands to keep the injection sites clean and call the clinic if there is any redness, swelling or pain.
Hide Second Medication?: No
Dose Administered (Numbers Only - Mg, G, Mcg, Units, Cc): 1